# Patient Record
Sex: MALE | Race: WHITE
[De-identification: names, ages, dates, MRNs, and addresses within clinical notes are randomized per-mention and may not be internally consistent; named-entity substitution may affect disease eponyms.]

---

## 2017-04-20 ENCOUNTER — HOSPITAL ENCOUNTER (INPATIENT)
Dept: HOSPITAL 47 - EC | Age: 42
LOS: 4 days | Discharge: HOME | DRG: 885 | End: 2017-04-24
Attending: PSYCHIATRY & NEUROLOGY | Admitting: PSYCHIATRY & NEUROLOGY
Payer: COMMERCIAL

## 2017-04-20 DIAGNOSIS — Z87.891: ICD-10-CM

## 2017-04-20 DIAGNOSIS — Z86.14: ICD-10-CM

## 2017-04-20 DIAGNOSIS — G47.33: ICD-10-CM

## 2017-04-20 DIAGNOSIS — F43.10: ICD-10-CM

## 2017-04-20 DIAGNOSIS — Z79.899: ICD-10-CM

## 2017-04-20 DIAGNOSIS — F33.8: Primary | ICD-10-CM

## 2017-04-20 DIAGNOSIS — E66.9: ICD-10-CM

## 2017-04-20 DIAGNOSIS — R45.851: ICD-10-CM

## 2017-04-20 PROCEDURE — 82075 ASSAY OF BREATH ETHANOL: CPT

## 2017-04-20 PROCEDURE — 85025 COMPLETE CBC W/AUTO DIFF WBC: CPT

## 2017-04-20 PROCEDURE — 80306 DRUG TEST PRSMV INSTRMNT: CPT

## 2017-04-20 PROCEDURE — 80053 COMPREHEN METABOLIC PANEL: CPT

## 2017-04-20 PROCEDURE — 84443 ASSAY THYROID STIM HORMONE: CPT

## 2017-04-20 PROCEDURE — 81003 URINALYSIS AUTO W/O SCOPE: CPT
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
## 2017-04-21 VITALS — RESPIRATION RATE: 16 BRPM

## 2017-04-21 LAB
BASOPHILS # BLD AUTO: 0 K/UL (ref 0–0.2)
BASOPHILS NFR BLD AUTO: 1 %
CH: 29.7
CHCM: 34.4
EOSINOPHIL # BLD AUTO: 0.1 K/UL (ref 0–0.7)
EOSINOPHIL NFR BLD AUTO: 2 %
ERYTHROCYTE [DISTWIDTH] IN BLOOD BY AUTOMATED COUNT: 5.21 M/UL (ref 4.3–5.9)
ERYTHROCYTE [DISTWIDTH] IN BLOOD: 13 % (ref 11.5–15.5)
HCT VFR BLD AUTO: 45.3 % (ref 39–53)
HDW: 2.57
HGB BLD-MCNC: 15.3 GM/DL (ref 13–17.5)
LUC NFR BLD AUTO: 2 %
LYMPHOCYTES # SPEC AUTO: 1.4 K/UL (ref 1–4.8)
LYMPHOCYTES NFR SPEC AUTO: 24 %
MCH RBC QN AUTO: 29.4 PG (ref 25–35)
MCHC RBC AUTO-ENTMCNC: 33.8 G/DL (ref 31–37)
MCV RBC AUTO: 86.9 FL (ref 80–100)
MONOCYTES # BLD AUTO: 0.4 K/UL (ref 0–1)
MONOCYTES NFR BLD AUTO: 6 %
NEUTROPHILS # BLD AUTO: 3.7 K/UL (ref 1.3–7.7)
NEUTROPHILS NFR BLD AUTO: 65 %
WBC # BLD AUTO: 0.08 10*3/UL
WBC # BLD AUTO: 5.7 K/UL (ref 3.8–10.6)
WBC (PEROX): 5.62

## 2017-04-21 RX ADMIN — NICOTINE SCH: 14 PATCH, EXTENDED RELEASE TRANSDERMAL at 08:44

## 2017-04-21 RX ADMIN — ACETAMINOPHEN PRN MG: 325 TABLET, FILM COATED ORAL at 01:15

## 2017-04-21 RX ADMIN — ACETAMINOPHEN PRN MG: 325 TABLET, FILM COATED ORAL at 08:45
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
## 2017-04-21 NOTE — CONS
DATE OF CONSULTATION:  04/21/2017. 



REASON FOR CONSULTATION: Medical management requested by Dr. Ng. 



CONSULTATION: This is a 41-year-old patient of Dr. Ng who is 

rather depressed with some suicidal ideation; hence admitted for the 

same. Patient had multiple traumas in the past; has been diagnosed 

with sleep apnea (      ) insurance change is not using CPAP machine. 

No other medical history.  



REVIEW OF SYSTEMS: 

CONSTITUTIONAL: None. 

HEENT: None. 

RESPIRATORY: None. 

CARDIOVASCULAR: None. 

GASTROINTESTINAL: None. 

GENITOURINARY: None.

MUSCULOSKELETAL: None. 

DERMATOLOGIC: None. 

HEMATOLOGIC: None. 

LYMPHATICS: None. 

PSYCHIATRY: Depressed. 

NEUROLOGICAL: None. 



PAST HISTORY: Sleep apnea, MRSA infection. 



PAST SURGICAL HISTORY: 

1. Orthopedic surgery. 

2. Fractured nasal bones. 

3. ACL, right leg. 





PAST PSYCHIATRIC HISTORY:  Depression, suicide attempt in the past. 



SOCIAL HISTORY: Rarely smokes. . Works in manufacturing. 



FAMILY HISTORY: Brother had arrhythmias. 



HOME MEDICATIONS: None. 



ALLERGIES: NONE. 



On examination, temperature 98, pulse 55, respiration 16, blood 

pressure 115/71, pulse ox 97% on room air.  

GENERAL APPEARANCE: Well built; BMI of 35.9. Sitting up. Comfortable. 

EYES: Pupils equal. Conjunctivae normal. 

HEENT: External appearance of nose and ears normal. Oral cavity normal. 

NECK: JVD not raised. Mass not palpable. 

RESPIRATORY: Effort normal. Lungs are clear. 

CARDIOVASCULAR: First and second sounds normal. No edema. 

ABDOMEN: Soft, nontender. Liver and spleen not palpable. 

LYMPHATIC: No lymph node palpable in neck or axillae. 

PSYCHIATRIC: Alert and oriented x3. Mood and affect normal.  



INVESTIGATIONS: White count 5.7, hemoglobin 15.3, platelets 331. TSH 

0.896. Urine drug screen negative.  



ASSESSMENT: 

1. Obstructive sleep apnea; patient does not use a CPAP machine. 

2. Obesity; body mass index of 35.9. 

3. Major depression with suicidal ideation. 



PLAN: Patient should follow up with Dr. Ng to try to establish 

and get his CPAP machine. Should see a dietitian for weight loss 

measures. Go on an 1800-calorie diet. Thank you, Dr. Davis.

## 2017-04-22 LAB
ALP SERPL-CCNC: 62 U/L (ref 38–126)
ALT SERPL-CCNC: 44 U/L (ref 21–72)
ANION GAP SERPL CALC-SCNC: 11 MMOL/L
AST SERPL-CCNC: 31 U/L (ref 17–59)
BUN SERPL-SCNC: 15 MG/DL (ref 9–20)
CALCIUM SPEC-MCNC: 9.8 MG/DL (ref 8.4–10.2)
CHLORIDE SERPL-SCNC: 105 MMOL/L (ref 98–107)
CO2 SERPL-SCNC: 27 MMOL/L (ref 22–30)
GLUCOSE SERPL-MCNC: 87 MG/DL (ref 74–99)
NON-AFRICAN AMERICAN GFR(MDRD): >60
POTASSIUM SERPL-SCNC: 4.4 MMOL/L (ref 3.5–5.1)
PROT SERPL-MCNC: 7.3 G/DL (ref 6.3–8.2)
SODIUM SERPL-SCNC: 143 MMOL/L (ref 137–145)

## 2017-04-22 RX ADMIN — NICOTINE SCH: 14 PATCH, EXTENDED RELEASE TRANSDERMAL at 09:43

## 2017-04-23 LAB
PH UR: 6 [PH] (ref 5–8)
SP GR UR: 1 (ref 1–1.03)
UA BILLING (MACRO VS. MICRO): (no result)
UROBILINOGEN UR QL STRIP: <2 MG/DL (ref ?–2)

## 2017-04-23 RX ADMIN — NICOTINE SCH: 14 PATCH, EXTENDED RELEASE TRANSDERMAL at 08:24

## 2017-04-23 NOTE — PN
DATE OF SERVICE: 04/22/2017 



CHIEF COMPLAINT: The patient was depressed. He was suicidal. His wife 

documented in a petition that he had tied a rope in the garage with a 

plan to hang himself.  



INTERVAL HISTORY: Patient has been doing fair. He had a quiet evening 

last evening. He slept fair. It is noted that yesterday he attended 

groups though he seemed to show low energy and did not make too much 

effort to participate today. In group he showed a little more 

responsiveness to the group situation. He has not had any problems 

with start of Prozac. He did say he had some feelings in the day of a 

little tremor, more in his body than anywhere else. He said it was not 

significant and then seemed to pass. He was not sure if it was related 

to the medicine or just other issues. He noted that he did not sleep 

that well last night and that may have been part of the problem. He 

has not had any GI issues other than he does not feel he gets enough 

food at meals. He was visiting with his wife when I saw him. The plan 

is for the couple to have a family meeting with the  

tomorrow. I reviewed my history with the patient and wife. She was in 

agreement with what was documented. She did not identify any immediate 

issues of concern beyond what our focus has been during his stay. It 

is noted that the wife reported the 2 children are eager for father to 

return home. The patient has not had change in his general health. He 

tolerates his psychotropic medication.  



MENTAL STATUS: Patient sat without restlessness. Eye contact was fair. 

Psychomotor activity was slow. Speech was monotone. He answered 

questions with brief responses. His thoughts were clear. He was not 

spontaneous or interactive. His affect was blunted. His mood reserved. 

He was somewhat distressed.  



ASSESSMENT: I will continue the current diagnosis and treatment plan. 

Continue psychotropic medications the same. He will continue Prozac 20 

mg a day. I reviewed treatment issues relating to start up of an 

antidepressant. Will have a family meeting tomorrow and begin working 

on discharge planning.

## 2017-04-24 VITALS — TEMPERATURE: 98 F | DIASTOLIC BLOOD PRESSURE: 57 MMHG | HEART RATE: 71 BPM | SYSTOLIC BLOOD PRESSURE: 117 MMHG

## 2017-04-24 RX ADMIN — NICOTINE SCH: 14 PATCH, EXTENDED RELEASE TRANSDERMAL at 08:42

## 2017-04-24 NOTE — PN
DATE OF SERVICE:  04/23/2017 



CHIEF COMPLAINT: The patient was depressed. He was suicidal. His wife 

documented in a petition that he had a rope tied in the garage with a 

plan to hang himself.  



INTERVAL HISTORY: Patient has been doing fairly well, he had a quiet 

evening last night. He has been attending groups. He slept better 

through the night. Today he has been up and about. He said that he met 

with the  and they looked at plans towards discharge.  

The patient does have some limited therapy sessions available through 

her work, though at this point one option is for them to be referred 

to her work therapy program and from there, get guided towards further 

treatment options. The patient says that his wife is acknowledging 

some need for therapy, including possibly anger management.   Patient 

himself said he is open for couple's therapy. He seems to be doing 

better in his mood. He has a better outlook. He comes out in the day 

area. He will interact with others. He has not had change in his 

general health. He tolerates his psychotropic medications.  



MENTAL STATUS: Patient gave good eye contact. Psychomotor activity was 

a little slow. Speech was somewhat monotone. He answered questions 

with direct responses. His thoughts were clear. His affect was a 

little blunted. His mood reserved. He did not appear to be 

significantly distressed.  



ASSESSMENT: I will continue current diagnosis and treatment plan. Will 

continue psychotropic medications the same.  The patient appears to be 

making progress.  We will look to coordinate with outpatient resources 

for followup care. Would consider discharge early in the week.
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
## 2019-02-04 ENCOUNTER — HOSPITAL ENCOUNTER (OUTPATIENT)
Dept: HOSPITAL 47 - RADUSWWP | Age: 44
End: 2019-02-04
Attending: FAMILY MEDICINE
Payer: COMMERCIAL

## 2019-02-04 DIAGNOSIS — I86.1: ICD-10-CM

## 2019-02-04 DIAGNOSIS — N43.3: ICD-10-CM

## 2019-02-04 DIAGNOSIS — N20.0: Primary | ICD-10-CM

## 2019-02-04 DIAGNOSIS — N50.3: ICD-10-CM

## 2019-02-04 PROCEDURE — 93975 VASCULAR STUDY: CPT

## 2019-02-04 PROCEDURE — 76770 US EXAM ABDO BACK WALL COMP: CPT

## 2019-02-04 PROCEDURE — 76870 US EXAM SCROTUM: CPT

## 2019-02-04 NOTE — US
EXAMINATION TYPE: US scrotum with doppler.  Grayscale and color Doppler Duplex imaging performed of t
he scrotum.

 

DATE OF EXAM: 2/4/2019

 

COMPARISON: NONE

 

CLINICAL HISTORY: N50.819 Testicular Pain, R31.9 Blood In urine.

 

 

EXAM MEASUREMENTS:

 

TESTICLES:

Right Testicle:  4.4 x 1.9 x 2.8 cm

Left Testicle:  3.8 x 2.0 x 3.1 cm

 

EPIDIDYMIS HEAD:

Right Epididymis:  1.2 cm

Left Epididymis:  0.7 cm  

 

Doppler performed to assess for testicular vascularity; good bilateral color flow and waveforms are s
een.   There is no evidence of testicular torsion.

 

Presence of hydroceles:  Small amount of fluid on the left

Presence of varicoceles:  Yes, bilaterally 

 

Right epididymal cyst visualized measuring 1.2 x 1.4 x 2.2 cm . This appears simple. 

 

 

 

IMPRESSION: 

1. Bilateral varicoceles are identified. Given the bilateral presence CT abdomen and pelvis is recomm
ended to exclude retroperitoneal obstructing mass.

2. Benign-appearing right epididymal cyst and small left hydrocele are incidentally noted.

3. No current evidence of testicular torsion at the time of examination.

## 2019-02-04 NOTE — US
EXAMINATION TYPE: US kidneys/renal and bladder

 

DATE OF EXAM: 2/4/2019

 

COMPARISON: NONE

 

CLINICAL HISTORY: N50.819 Testicular Pain, R31.9 Blood In urine.

 

EXAM MEASUREMENTS:

 

Right Kidney:  11.4 x 6.3 x 5.8 cm

Left Kidney: 12.3 x 6.4 x 6.3 cm

 

 

Right Kidney: No hydronephrosis or masses seen  

Left Kidney: No hydronephrosis. Echogenic foci visualized upper pole measuring 0.6 cm, with history n
on-obstructing stone   

Bladder: wnl

**Bilateral Jets seen: Yes

 

There is no evidence for hydronephrosis at this point in time.  No masses are identified.  The urinar
y bladder is anechoic.  Bilateral ureteral jets are seen.

 

IMPRESSION:

Nonobstructing left renal calculi measuring 6 mm. No hydronephrosis of either kidney.

## 2019-03-21 ENCOUNTER — HOSPITAL ENCOUNTER (OUTPATIENT)
Dept: HOSPITAL 47 - SLEEP | Age: 44
End: 2019-03-21
Attending: INTERNAL MEDICINE
Payer: COMMERCIAL

## 2019-03-21 DIAGNOSIS — G47.33: Primary | ICD-10-CM

## 2019-03-21 DIAGNOSIS — E66.9: ICD-10-CM

## 2019-03-21 DIAGNOSIS — Z98.890: ICD-10-CM

## 2019-03-21 PROCEDURE — 99211 OFF/OP EST MAY X REQ PHY/QHP: CPT
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
## 2019-10-02 ENCOUNTER — HOSPITAL ENCOUNTER (OUTPATIENT)
Dept: HOSPITAL 47 - SLEEP | Age: 44
Discharge: HOME | End: 2019-10-02
Attending: INTERNAL MEDICINE
Payer: COMMERCIAL

## 2019-10-02 DIAGNOSIS — E66.9: ICD-10-CM

## 2019-10-02 DIAGNOSIS — Z87.81: ICD-10-CM

## 2019-10-02 DIAGNOSIS — G47.33: Primary | ICD-10-CM

## 2019-10-02 DIAGNOSIS — Z98.890: ICD-10-CM
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
## 2019-12-19 ENCOUNTER — HOSPITAL ENCOUNTER (OUTPATIENT)
Dept: HOSPITAL 47 - SLEEP | Age: 44
Discharge: HOME | End: 2019-12-19
Attending: INTERNAL MEDICINE
Payer: COMMERCIAL

## 2019-12-19 DIAGNOSIS — G47.33: Primary | ICD-10-CM

## 2019-12-19 DIAGNOSIS — Z87.39: ICD-10-CM

## 2019-12-19 DIAGNOSIS — Z99.89: ICD-10-CM

## 2019-12-19 DIAGNOSIS — E66.9: ICD-10-CM

## 2019-12-19 DIAGNOSIS — Z98.890: ICD-10-CM
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
## 2020-02-26 ENCOUNTER — HOSPITAL ENCOUNTER (OUTPATIENT)
Dept: HOSPITAL 47 - RADUSWWP | Age: 45
Discharge: HOME | End: 2020-02-26
Attending: NURSE PRACTITIONER
Payer: COMMERCIAL

## 2020-02-26 DIAGNOSIS — M47.817: ICD-10-CM

## 2020-02-26 DIAGNOSIS — E04.9: Primary | ICD-10-CM

## 2020-02-26 PROCEDURE — 72100 X-RAY EXAM L-S SPINE 2/3 VWS: CPT

## 2020-02-26 PROCEDURE — 76536 US EXAM OF HEAD AND NECK: CPT

## 2020-02-26 NOTE — US
EXAMINATION TYPE: US thyroid st tissue head/neck

 

DATE OF EXAM: 2/26/2020

 

COMPARISON: NONE

 

CLINICAL HISTORY: R09.89 specified symptoms and signs.

 

GLAND SIZE:

 

Right Lobe: 5.2 x 1.8 x 1.9 cm

** Overall Parenchyma:  homogenous

Left Lobe: 4.4 x 1.7 x 2.0 cm

** Overall Parenchyma:  homogeneous

Isthmus Thickness:  0.4 cm

 

NODULES

 

RIGHT:   # of nodules measured on right: 0

 

 

LEFT:    # of nodules measured on left:  0

 

 

ISTHMUS:    # of nodules measured in the isthmus: 0

 

 

Bilateral neck scanned, no evidence of lymphadenopathy.

 

 

 

 

 

IMPRESSION:

Mildly enlarged right thyroid lobe. Otherwise unremarkable study.

## 2020-02-26 NOTE — XR
EXAMINATION TYPE: XR lumbar spine 2 or 3V

 

DATE OF EXAM: 2/26/2020

 

COMPARISON: 10/20/2009

 

HISTORY: [age] low [age]

 

TECHNIQUE: Three-view lumbar spine

 

FINDINGS: There 5 lumbar-type vertebral bodies. The pedicles are intact. Disc space narrowing posteri
freda at L5-S1 is present. Remaining disc heights are preserved. Vertebral body heights are preserved.
 Spina bifida occulta is present at S1. No significant interval changes are evident.

 

IMPRESSION:

1.  Mild degenerative disc changes posterior L5-S1.

## 2021-03-22 ENCOUNTER — HOSPITAL ENCOUNTER (EMERGENCY)
Dept: HOSPITAL 47 - EC | Age: 46
Discharge: HOME | End: 2021-03-22
Payer: COMMERCIAL

## 2021-03-22 VITALS
SYSTOLIC BLOOD PRESSURE: 114 MMHG | HEART RATE: 67 BPM | DIASTOLIC BLOOD PRESSURE: 68 MMHG | RESPIRATION RATE: 18 BRPM | TEMPERATURE: 98.7 F

## 2021-03-22 DIAGNOSIS — Z99.89: ICD-10-CM

## 2021-03-22 DIAGNOSIS — Z86.14: ICD-10-CM

## 2021-03-22 DIAGNOSIS — M54.31: Primary | ICD-10-CM

## 2021-03-22 DIAGNOSIS — G47.33: ICD-10-CM

## 2021-03-22 LAB
ALBUMIN SERPL-MCNC: 4.4 G/DL (ref 3.5–5)
ALP SERPL-CCNC: 86 U/L (ref 38–126)
ALT SERPL-CCNC: 45 U/L (ref 4–49)
ANION GAP SERPL CALC-SCNC: 8 MMOL/L
AST SERPL-CCNC: 29 U/L (ref 17–59)
BASOPHILS # BLD AUTO: 0 K/UL (ref 0–0.2)
BASOPHILS NFR BLD AUTO: 1 %
BUN SERPL-SCNC: 16 MG/DL (ref 9–20)
CALCIUM SPEC-MCNC: 9.2 MG/DL (ref 8.4–10.2)
CHLORIDE SERPL-SCNC: 104 MMOL/L (ref 98–107)
CO2 SERPL-SCNC: 26 MMOL/L (ref 22–30)
EOSINOPHIL # BLD AUTO: 0.1 K/UL (ref 0–0.7)
EOSINOPHIL NFR BLD AUTO: 2 %
ERYTHROCYTE [DISTWIDTH] IN BLOOD BY AUTOMATED COUNT: 5.21 M/UL (ref 4.3–5.9)
ERYTHROCYTE [DISTWIDTH] IN BLOOD: 12.9 % (ref 11.5–15.5)
GLUCOSE SERPL-MCNC: 90 MG/DL (ref 74–99)
HCT VFR BLD AUTO: 45.3 % (ref 39–53)
HGB BLD-MCNC: 15.2 GM/DL (ref 13–17.5)
LYMPHOCYTES # SPEC AUTO: 1.2 K/UL (ref 1–4.8)
LYMPHOCYTES NFR SPEC AUTO: 36 %
MCH RBC QN AUTO: 29.2 PG (ref 25–35)
MCHC RBC AUTO-ENTMCNC: 33.6 G/DL (ref 31–37)
MCV RBC AUTO: 87.1 FL (ref 80–100)
MONOCYTES # BLD AUTO: 0.4 K/UL (ref 0–1)
MONOCYTES NFR BLD AUTO: 10 %
NEUTROPHILS # BLD AUTO: 1.7 K/UL (ref 1.3–7.7)
NEUTROPHILS NFR BLD AUTO: 50 %
PH UR: 5.5 [PH] (ref 5–8)
PLATELET # BLD AUTO: 231 K/UL (ref 150–450)
POTASSIUM SERPL-SCNC: 4.5 MMOL/L (ref 3.5–5.1)
PROT SERPL-MCNC: 7 G/DL (ref 6.3–8.2)
SODIUM SERPL-SCNC: 138 MMOL/L (ref 137–145)
SP GR UR: 1.03 (ref 1–1.03)
UROBILINOGEN UR QL STRIP: <2 MG/DL (ref ?–2)
WBC # BLD AUTO: 3.5 K/UL (ref 3.8–10.6)

## 2021-03-22 PROCEDURE — 72110 X-RAY EXAM L-2 SPINE 4/>VWS: CPT

## 2021-03-22 PROCEDURE — 99285 EMERGENCY DEPT VISIT HI MDM: CPT

## 2021-03-22 PROCEDURE — 85025 COMPLETE CBC W/AUTO DIFF WBC: CPT

## 2021-03-22 PROCEDURE — 80053 COMPREHEN METABOLIC PANEL: CPT

## 2021-03-22 PROCEDURE — 74176 CT ABD & PELVIS W/O CONTRAST: CPT

## 2021-03-22 PROCEDURE — 36415 COLL VENOUS BLD VENIPUNCTURE: CPT

## 2021-03-22 PROCEDURE — 81003 URINALYSIS AUTO W/O SCOPE: CPT

## 2021-03-22 NOTE — CT
EXAMINATION TYPE: CT abdomen pelvis wo con

 

DATE OF EXAM: 3/22/2021

 

COMPARISON: None

 

INDICATION: Back pain and groin pain

 

DLP: 1170.4 mGycm, Automated exposure control for dose reduction was used.

 

CONTRAST:  0 mL of Isovue 300. 

                        Study performed without Oral Contrast

 

TECHNIQUE: Axial images were obtained from above the diaphragm to the pubic rami in the axial plane a
t 5 mm thick sections.  Reconstructed images are reviewed on the computer in the coronal plane.  

 

FINDINGS:

 

Limited CT sections are obtained the lung bases.  There is minimal infiltrate in the posterior depend
ent left lung base. Correlate for atelectasis.

 

CT ABDOMEN:

 

Liver: Normal

 

Spleen: Normal

 

Pancreas: Normal

 

Adrenal glands: The adrenal glands are normal.

 

Gallbladder: Normal  

 

Kidneys: No masses are evident. No hydronephrosis is present.   No cysts are present.  There is a non
obstructing 0.4 cm superior pole left renal stone.

 

Aorta: Normal

 

Inferior vena cava: Normal.

 

CT PELVIS: 

Loops of bowel within the abdomen and pelvis are normal.  Study is performed without oral contrast li
miting evaluation.  

 

Appendix: Normal as visualized.

 

Urinary bladder: Normal. 

 

Genitourinary structures: Prostate is slightly prominent.

 

Osseous structures: No suspicious lytic or sclerotic lesions. No suspicious spinal canal stenosis lum
bar spine is evident.

 

IMPRESSIONS:

1.  Nonobstructive superior pole left renal stone.
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
## 2021-03-22 NOTE — XR
EXAMINATION TYPE: XR lumbosacral spine min 4V

 

DATE OF EXAM: 3/22/2021

 

COMPARISON: 2/26/2020

 

HISTORY: Back pain

 

TECHNIQUE: 5V lumbar spine

 

FINDINGS: There are 5 lumbar-type vertebral bodies. The pedicles are intact. No spondylolytic defects
 are evident. Facets are normal. Disc heights are preserved. Minimal degenerative disc changes in the
 posterior L5-S1 disc space is less prominent than prior. Vertebral body heights are preserved. No si
gnificant interval change is evident.

 

IMPRESSION:

1.  No acute osseous abnormality lumbar spine

## 2021-04-30 ENCOUNTER — HOSPITAL ENCOUNTER (EMERGENCY)
Dept: HOSPITAL 47 - EC | Age: 46
Discharge: HOME | End: 2021-04-30
Payer: COMMERCIAL

## 2021-04-30 VITALS
RESPIRATION RATE: 18 BRPM | TEMPERATURE: 97.9 F | DIASTOLIC BLOOD PRESSURE: 87 MMHG | SYSTOLIC BLOOD PRESSURE: 142 MMHG | HEART RATE: 59 BPM
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
## 2021-04-30 DIAGNOSIS — G47.30: ICD-10-CM
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
## 2021-04-30 DIAGNOSIS — S61.011A: Primary | ICD-10-CM

## 2021-04-30 DIAGNOSIS — I20.9: ICD-10-CM
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
## 2021-04-30 PROCEDURE — 90715 TDAP VACCINE 7 YRS/> IM: CPT

## 2021-04-30 PROCEDURE — 73140 X-RAY EXAM OF FINGER(S): CPT

## 2021-04-30 PROCEDURE — 12002 RPR S/N/AX/GEN/TRNK2.6-7.5CM: CPT

## 2021-04-30 PROCEDURE — 99282 EMERGENCY DEPT VISIT SF MDM: CPT

## 2021-04-30 PROCEDURE — 90471 IMMUNIZATION ADMIN: CPT

## 2021-04-30 NOTE — XR
EXAMINATION TYPE: XR finger RT

 

DATE OF EXAM: 4/30/2021

 

COMPARISON: NONE

 

HISTORY: Pain

 

TECHNIQUE: Three views are submitted.

 

FINDINGS:

Tiny bony density adjacent to the base of the proximal phalanx of the first digit. Well-corticated de
nsity adjacent to the base of the first metacarpal.

 

IMPRESSION:

1. Tiny punctate areas of density seen adjacent to the first MCP joint could represent tiny foreign b
natalia versus tiny chip or avulsion fracture correlate clinically.
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
## 2024-02-26 ENCOUNTER — HOSPITAL ENCOUNTER (OUTPATIENT)
Dept: HOSPITAL 47 - RADCTMAIN | Age: 49
Discharge: HOME | End: 2024-02-26
Attending: FAMILY MEDICINE
Payer: COMMERCIAL

## 2024-02-26 DIAGNOSIS — Z82.49: ICD-10-CM

## 2024-02-26 DIAGNOSIS — I25.10: ICD-10-CM

## 2024-02-26 DIAGNOSIS — Z13.6: Primary | ICD-10-CM

## 2024-02-26 PROCEDURE — 75571 CT HRT W/O DYE W/CA TEST: CPT

## 2024-02-27 NOTE — CT
EXAMINATION TYPE: CT heart w calcium score

 

DATE OF EXAM: 2/26/2024

 

COMPARISON:   None 

 

HISTORY: Screening for cardiovascular disorder. 213.9

 

CT DLP: 288.5 mGycm

Automated exposure control for dose reduction was used.

 

CT CALCIUM SCORING

Coronary calcium is a marker for plaque (fatty deposits) in a blood vessel or atherosclerosis (harden
ing of the arteries).  The presence and amount of calcium detected in a coronary artery by the CT sca
n, indicates the presence and amount of atherosclerotic plaque.  These calcium deposits appear years 
before the development of heart disease symptoms such as chest pain and shortness of breath.

 

A calcium score is computed for each of the coronary arteries based upon the volume and density of th
e calcium deposits.  This can be referred to as your calcified plaque burden.  It does not correspond
 directly to the percentage of narrowing in the artery but does correlate with the severity of the un
derlying coronary atherosclerosis.

 

PROCEDURE

 

TECHNIQUE - Prospective Gating was used.  Slice thickness: 3mm.

Density threshold (HU): 130, Pixel threshold: 3, Algorithm: discrete.

 

RESULTS

 

Region:  LM

Calcium Score (Agatston): 0

Volume (mm3): 0

Mass (g): 0

 

Region:  RCA

Calcium Score (Agatston): 0

Volume (mm3): 0

Mass (g): 0

 

Region:  LAD

Calcium Score (Agatston): 165.67

Volume (mm3): 136.46

Mass (g): 45.49

 

Region:  CX

Calcium Score (Agatston): 16.09

Volume (mm3): 16.09

Mass (g): 5.36

 

Region:  PDA

Calcium Score (Agatston):  0

Volume (mm3):  0

Mass (g):  0

 

Total:

Calcium Score (Agatston):  181.76

Volume (mm3): 152.54

Mass (g):  50.85

 

TOTAL CALCIUM SCORE:  181.76

 

IMPRESSION: 

 

Calcium Score: 181.76

 

Implication:  Definite, at least moderate atherosclerotic plaque.

 

Risk of Coronary Artery Disease: Mild coronary artery disease highly likely, significant narrowings p
ossible

 

 

CALCIUM SCORE    IMPLICATION                                                                RISK OF C
ORONARY ARTERY DISEASE

0                                    No identifiable plaque                                          
          Very low, generally less than 5%

1-10                              Minimal identifiable plaque                                        
    Very unlikely, less than 10%

                         Definite, at least mild atherosclerotic plaque               Mild or m
inimal coronary narrowings likely

101-400                       Definite, at least moderate atherosclerotic plaque     Mild coronary ar
dora disease highly likely, significant narrowing possible

401 or Higher              Extensive atherosclerotic plaque                                  High lik
elihood of at least one significant coronary narrowing